# Patient Record
Sex: FEMALE | Race: WHITE | URBAN - METROPOLITAN AREA
[De-identification: names, ages, dates, MRNs, and addresses within clinical notes are randomized per-mention and may not be internally consistent; named-entity substitution may affect disease eponyms.]

---

## 2019-09-18 ENCOUNTER — OFFICE VISIT (OUTPATIENT)
Dept: URBAN - METROPOLITAN AREA CLINIC 17 | Facility: CLINIC | Age: 69
End: 2019-09-18
Payer: MEDICARE

## 2019-09-18 DIAGNOSIS — H40.1134 PRIMARY OPEN-ANGLE GLAUCOMA, BILATERAL, INDETERMINATE STAGE: Primary | ICD-10-CM

## 2019-09-18 PROCEDURE — 99204 OFFICE O/P NEW MOD 45 MIN: CPT | Performed by: OPHTHALMOLOGY

## 2019-09-18 PROCEDURE — 92133 CPTRZD OPH DX IMG PST SGM ON: CPT | Performed by: OPHTHALMOLOGY

## 2019-09-18 PROCEDURE — 76514 ECHO EXAM OF EYE THICKNESS: CPT | Performed by: OPHTHALMOLOGY

## 2019-09-18 PROCEDURE — 92020 GONIOSCOPY: CPT | Performed by: OPHTHALMOLOGY

## 2019-09-18 RX ORDER — LATANOPROST 50 UG/ML
0.005 % SOLUTION OPHTHALMIC
Qty: 2.5 | Refills: 11 | Status: INACTIVE
Start: 2019-09-18 | End: 2020-01-15

## 2019-09-18 ASSESSMENT — INTRAOCULAR PRESSURE
OD: 17
OS: 16

## 2019-09-18 NOTE — IMPRESSION/PLAN
Impression: Primary open-angle glaucoma, bilateral, indeterminate stage: H40.1134. -IOPs stable today, -average CCTs, no need to adjust IOPs Plan: Discussed diagnosis, explained and understood by patient. Discussed IOP/ONH/Glaucoma management and risks. Continue latanoprost QHS OU Will continue to monitor condition and symptoms.

## 2020-01-15 ENCOUNTER — NEW PATIENT (OUTPATIENT)
Dept: URBAN - METROPOLITAN AREA CLINIC 10 | Facility: CLINIC | Age: 70
End: 2020-01-15
Payer: MEDICARE

## 2020-01-15 DIAGNOSIS — H43.813 VITREOUS DEGENERATION, BILATERAL: ICD-10-CM

## 2020-01-15 PROCEDURE — 92083 EXTENDED VISUAL FIELD XM: CPT | Performed by: OPHTHALMOLOGY

## 2020-01-15 PROCEDURE — 76514 ECHO EXAM OF EYE THICKNESS: CPT | Performed by: OPHTHALMOLOGY

## 2020-01-15 PROCEDURE — 92004 COMPRE OPH EXAM NEW PT 1/>: CPT | Performed by: OPHTHALMOLOGY

## 2020-01-15 PROCEDURE — 92133 CPTRZD OPH DX IMG PST SGM ON: CPT | Performed by: OPHTHALMOLOGY

## 2020-01-15 PROCEDURE — 92020 GONIOSCOPY: CPT | Performed by: OPHTHALMOLOGY

## 2020-01-15 RX ORDER — LATANOPROST 50 UG/ML
0.005 % SOLUTION OPHTHALMIC
Qty: 3 | Refills: 1 | Status: INACTIVE
Start: 2020-01-15 | End: 2020-02-26

## 2020-01-15 ASSESSMENT — VISUAL ACUITY
OD: 20/25
OS: 20/25

## 2020-01-15 ASSESSMENT — INTRAOCULAR PRESSURE
OD: 16
OS: 18

## 2020-02-26 ENCOUNTER — FOLLOW UP ESTABLISHED (OUTPATIENT)
Dept: URBAN - METROPOLITAN AREA CLINIC 10 | Facility: CLINIC | Age: 70
End: 2020-02-26
Payer: MEDICARE

## 2020-02-26 PROCEDURE — 92082 INTERMEDIATE VISUAL FIELD XM: CPT | Performed by: OPHTHALMOLOGY

## 2020-02-26 PROCEDURE — 92012 INTRM OPH EXAM EST PATIENT: CPT | Performed by: OPHTHALMOLOGY

## 2020-02-26 RX ORDER — LATANOPROST 50 UG/ML
0.005 % SOLUTION OPHTHALMIC
Qty: 3 | Refills: 1 | Status: INACTIVE
Start: 2020-02-26 | End: 2020-08-20

## 2020-02-26 ASSESSMENT — INTRAOCULAR PRESSURE
OD: 17
OS: 19

## 2020-07-22 ENCOUNTER — FOLLOW UP ESTABLISHED (OUTPATIENT)
Dept: URBAN - METROPOLITAN AREA CLINIC 30 | Facility: CLINIC | Age: 70
End: 2020-07-22
Payer: MEDICARE

## 2020-07-22 PROCEDURE — 92014 COMPRE OPH EXAM EST PT 1/>: CPT | Performed by: OPHTHALMOLOGY

## 2020-07-22 ASSESSMENT — INTRAOCULAR PRESSURE
OD: 18
OS: 18

## 2020-10-14 ENCOUNTER — FOLLOW UP ESTABLISHED (OUTPATIENT)
Dept: URBAN - METROPOLITAN AREA CLINIC 30 | Facility: CLINIC | Age: 70
End: 2020-10-14
Payer: MEDICARE

## 2020-10-14 DIAGNOSIS — H40.1131 PRIMARY OPEN-ANGLE GLAUCOMA, BILATERAL, MILD STAGE: Primary | ICD-10-CM

## 2020-10-14 PROCEDURE — 92083 EXTENDED VISUAL FIELD XM: CPT | Performed by: OPHTHALMOLOGY

## 2020-10-14 PROCEDURE — 92014 COMPRE OPH EXAM EST PT 1/>: CPT | Performed by: OPHTHALMOLOGY

## 2020-10-14 ASSESSMENT — INTRAOCULAR PRESSURE
OD: 17
OS: 17

## 2020-11-06 ENCOUNTER — SURGERY (OUTPATIENT)
Dept: URBAN - METROPOLITAN AREA SURGERY 12 | Facility: SURGERY | Age: 70
End: 2020-11-06
Payer: MEDICARE

## 2020-11-06 PROCEDURE — 65855 TRABECULOPLASTY LASER SURG: CPT | Performed by: OPHTHALMOLOGY

## 2020-11-20 ENCOUNTER — SURGERY (OUTPATIENT)
Dept: URBAN - METROPOLITAN AREA SURGERY 12 | Facility: SURGERY | Age: 70
End: 2020-11-20
Payer: MEDICARE

## 2020-11-20 PROCEDURE — 65855 TRABECULOPLASTY LASER SURG: CPT | Performed by: OPHTHALMOLOGY

## 2021-02-03 ENCOUNTER — FOLLOW UP ESTABLISHED (OUTPATIENT)
Dept: URBAN - METROPOLITAN AREA CLINIC 30 | Facility: CLINIC | Age: 71
End: 2021-02-03
Payer: MEDICARE

## 2021-02-03 PROCEDURE — 99214 OFFICE O/P EST MOD 30 MIN: CPT | Performed by: OPHTHALMOLOGY

## 2021-02-03 ASSESSMENT — INTRAOCULAR PRESSURE
OS: 17
OD: 17

## 2021-05-26 ENCOUNTER — OFFICE VISIT (OUTPATIENT)
Dept: URBAN - METROPOLITAN AREA CLINIC 30 | Facility: CLINIC | Age: 71
End: 2021-05-26
Payer: MEDICARE

## 2021-05-26 PROCEDURE — 99214 OFFICE O/P EST MOD 30 MIN: CPT | Performed by: OPHTHALMOLOGY

## 2021-05-26 RX ORDER — DORZOLAMIDE HCL 20 MG/ML
2 % SOLUTION/ DROPS OPHTHALMIC
Qty: 5 | Refills: 3 | Status: INACTIVE
Start: 2021-05-26 | End: 2021-08-25

## 2021-05-26 ASSESSMENT — INTRAOCULAR PRESSURE
OS: 16
OD: 16

## 2021-05-26 NOTE — IMPRESSION/PLAN
Impression: Primary open-angle glaucoma, mild stage, bilateral
Intolerant to [employer] Plan: Pt has Glaucoma    Gonio :TM nasal all other quadrants S 1+ PG Pachs: 547/547     Today's IOP : 16/16     Tmax & date :  18/19 Target IOP low to mid teens Pt denies Fhx of Glaucoma Left eye is the better seeing eye Last vf OD Superior Nasal scotoma OS Full 7/21/20 C/D:  0.8x0.8 tilt, thin inferior temporal / .6-.6 very thin superior temporal rim OCT: 76/69  (1/15/2020) Pt denies Sulfa Allergy   // Pt denies Lung /Heart dx Pt is currently using : Latanoprost QHS OU Plan :
1. Cont:
Latanoprost QHS OU 
START Dorzolamide BID OU 2. Patient's IOP was unchanged with SLT OU. Intolerant to [employer]. Will have patient switch medicaiton and return for follow up. 
3. Return in 8 week IOP check

## 2021-07-22 ENCOUNTER — OFFICE VISIT (OUTPATIENT)
Dept: URBAN - METROPOLITAN AREA CLINIC 24 | Facility: CLINIC | Age: 71
End: 2021-07-22
Payer: MEDICARE

## 2021-07-22 PROCEDURE — 99213 OFFICE O/P EST LOW 20 MIN: CPT | Performed by: OPHTHALMOLOGY

## 2021-07-22 ASSESSMENT — INTRAOCULAR PRESSURE
OS: 15
OD: 16

## 2021-07-22 NOTE — IMPRESSION/PLAN
Impression: Primary open-angle glaucoma, mild stage, bilateral
Intolerant to [employer] Plan: Pt has Glaucoma    Gonio :TM nasal all other quadrants S 1+ PG Pachs: 547/547     Today's IOP : 16/16     Tmax & date :  16/15 Target IOP low to mid teens Pt denies Fhx of Glaucoma Left eye is the better seeing eye Last vf OD Superior Nasal scotoma OS Full 7/22/21 C/D:  0.8x0.8 tilt, thin inferior temporal / .6-.6 very thin superior temporal rim OCT: 76/69  (1/15/2020) Pt denies Sulfa Allergy   // Pt denies Lung /Heart dx Pt is currently using : Latanoprost QHS OU, Dorzolomide BID OU Plan :
1. Cont:
Latanoprost QHS OU Dorzolamide BID OU 2. Patient's IOP is stable along with stable VF 3.  Return 6 months DFE/repeat glare

## 2022-02-02 ENCOUNTER — OFFICE VISIT (OUTPATIENT)
Dept: URBAN - METROPOLITAN AREA CLINIC 30 | Facility: CLINIC | Age: 72
End: 2022-02-02
Payer: MEDICARE

## 2022-02-02 DIAGNOSIS — H25.12 AGE-RELATED NUCLEAR CATARACT, LEFT EYE: ICD-10-CM

## 2022-02-02 DIAGNOSIS — H25.11 AGE-RELATED NUCLEAR CATARACT, RIGHT EYE: ICD-10-CM

## 2022-02-02 PROCEDURE — 99214 OFFICE O/P EST MOD 30 MIN: CPT | Performed by: OPHTHALMOLOGY

## 2022-02-02 ASSESSMENT — INTRAOCULAR PRESSURE
OS: 12
OD: 12

## 2022-02-02 NOTE — IMPRESSION/PLAN
Impression: Age-related nuclear cataract, right eye: H25.11. Plan: Discussed cataract diagnosis with the patient. Discussed risks, benefits and alternatives to surgery including but not limited to: bleeding, infection, risk of vision loss, loss of the eye, need for other surgery. Patient voiced understanding and wishes to proceed. Patient elects surgical treatment. Patient desires surgery OU. Patient understands the need for glasses after surgery for BCVA. MIGS Discussed with pt limitations of MIGS device and it does not replace  Glaucoma eye drops and would have to continue treatment and would still need glasses after surgery. Understands possible use of iris stretch. Right eye first (PC IOL (Standard W/ canaloplasty /Hydrus )(+)Dexy  (AIM: -3.00) ascan needed)  (NO ORA, NO LRI, NO LENSX) Patient to return for NON dilated Preop   (Discussed option of Toric lens if needed) Minutes

## 2022-02-02 NOTE — IMPRESSION/PLAN
Impression: Primary open-angle glaucoma, mild stage, bilateral
Intolerant to [employer] & Dorzolamide Plan: Pt has Glaucoma    Gonio :TM nasal all other quadrants S 1+ PG Pachs: 547/547     Today's IOP : 16/16     Tmax & date :  16/15 Target IOP low to mid teens Pt denies Fhx of Glaucoma Left eye is the better seeing eye Last vf OD Superior Nasal scotoma OS Full 7/22/21 C/D:  0.9x0.9/0.5x0.5 360 PPA OU 
OCT: 76/69  (1/15/2020) Pt denies Sulfa Allergy   // Pt denies Lung /Heart dx Pt is currently using : Latanoprost QHS OU, Dorzolomide BID OU Plan :
1. Cont:
Latanoprost QHS OU 
STOP Dorzolamide 2/2 allergy 2. Patient presents with allergy today. Will Stop medications as above.  
3. Recommend cat W MIGS

## 2022-02-02 NOTE — IMPRESSION/PLAN
Impression: Age-related nuclear cataract, left eye: H25.12. Plan: Discussed cataract diagnosis with the patient. Discussed risks, benefits and alternatives to surgery including but not limited to: bleeding, infection, risk of vision loss, loss of the eye, need for other surgery. Patient voiced understanding and wishes to proceed. Patient elects surgical treatment. Patient desires surgery OU. Patient understands the need for glasses after surgery for BCVA. MIGS Discussed with pt limitations of MIGS device and it does not replace  Glaucoma eye drops and would have to continue treatment and would still need glasses after surgery. Understands possible use of iris stretch. Left eye second( (PC IOL (Standard W/ canaloplasty /Hydrus )(+)Dexy  (AIM: -3.00) ascan needed)  (NO ORA, NO LRI, NO LENSX) Patient to return for NON dilated Preop (Discussed option of Toric lens if needed)  15 Minutes

## 2022-03-10 ENCOUNTER — TESTING ONLY (OUTPATIENT)
Dept: URBAN - METROPOLITAN AREA CLINIC 30 | Facility: CLINIC | Age: 72
End: 2022-03-10
Payer: MEDICARE

## 2022-03-10 PROCEDURE — 92083 EXTENDED VISUAL FIELD XM: CPT | Performed by: OPTOMETRIST

## 2022-04-01 ENCOUNTER — ADULT PHYSICAL (OUTPATIENT)
Dept: URBAN - METROPOLITAN AREA CLINIC 24 | Facility: CLINIC | Age: 72
End: 2022-04-01
Payer: MEDICARE

## 2022-04-01 DIAGNOSIS — Z01.818 ENCOUNTER FOR OTHER PREPROCEDURAL EXAMINATION: Primary | ICD-10-CM

## 2022-04-01 PROCEDURE — 99203 OFFICE O/P NEW LOW 30 MIN: CPT | Performed by: PHYSICIAN ASSISTANT

## 2022-04-01 RX ORDER — ESTRADIOL 0.1 MG/G
0.01 % CREAM VAGINAL
Qty: 0 | Refills: 0 | Status: ACTIVE
Start: 2022-04-01

## 2022-04-01 ASSESSMENT — PACHYMETRY
OD: 25.24
OS: 3.02
OS: 25.36
OD: 3.00

## 2022-04-04 ENCOUNTER — OFFICE VISIT (OUTPATIENT)
Dept: URBAN - METROPOLITAN AREA CLINIC 24 | Facility: CLINIC | Age: 72
End: 2022-04-04
Payer: MEDICARE

## 2022-04-04 DIAGNOSIS — H25.13 AGE-RELATED NUCLEAR CATARACT, BILATERAL: ICD-10-CM

## 2022-04-04 DIAGNOSIS — H33.323 BILATERAL ROUND HOLES OF RETINA: ICD-10-CM

## 2022-04-04 PROCEDURE — 99214 OFFICE O/P EST MOD 30 MIN: CPT | Performed by: OPHTHALMOLOGY

## 2022-04-04 ASSESSMENT — VISUAL ACUITY
OS: 20/25
OD: 20/25

## 2022-04-04 ASSESSMENT — INTRAOCULAR PRESSURE
OD: 13
OS: 14

## 2022-04-04 NOTE — IMPRESSION/PLAN
Impression: Primary open-angle glaucoma, mild stage, bilateral
Intolerant to [employer] & Dorzolamide Plan: Pt has Glaucoma    Gonio :TM nasal all other quadrants S 1+ PG Pachs: 547/547     Today's IOP : 13/14    Tmax & date :  16/15 Target IOP low to mid teens Pt denies Fhx of Glaucoma Left eye is the better seeing eye Last vf OD Superior Nasal scotoma OS Full 7/22/21 C/D:  0.9x0.9/0.5x0.5 360 PPA OU 
OCT: 76/69  (1/15/2020) Pt denies Sulfa Allergy  // Pt denies Lung /Heart dx Plan :
1. Continue:
Latanoprost QHS OU 2. Patient presents with allergy today. Will Stop medications as above. 3. Return to clinic as schedule for cat sx with migs ou
4.  Goal with cat/migs to eliminate all glc meds

## 2022-04-04 NOTE — IMPRESSION/PLAN
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  Plan: bilateral retinal hole - stable OU; good on depressed exam

## 2022-04-04 NOTE — IMPRESSION/PLAN
Impression: Age-related nuclear cataract, bilateral: H25.13. Plan: STANDARTD LENS (OD THEN OS)(NEAR AIM -3.00 OU: + DEXYCU 1st choice, + Block, NO ORA OU , NO LenSx OU,+ MIGS OU (OMNI,HYDRUS),)) - 

-15 Min Discussed cataract diagnosis with the patient. Appropriate testing ordered for cataract diagnosis prior to Preop. Risks and benefits of surgical treatment were discussed and understood. Patient desires surgical treatment. Discussed lens options with pt and pt is ok with wearing glasses after surgery. Patient desires surgery to proceed with surgery OD THEN OS. Both eyes examined, medically necessary due to impact in activities of daily living. Discussed with pt limitations of MIGS device and it does not replace  Glaucoma eye drops and would have to continue treatment and would still need glasses after surgery. Discussed higher risks with smaller pupil and discussed iris stretch and higher risks of bleeding.  Discussed there is a chance of developing capsular haze after surgery, which may be corrected with laser/yag

## 2022-04-21 ENCOUNTER — SURGERY (OUTPATIENT)
Dept: URBAN - METROPOLITAN AREA SURGERY 12 | Facility: SURGERY | Age: 72
End: 2022-04-21
Payer: MEDICARE

## 2022-04-21 DIAGNOSIS — H40.1131 PRIMARY OPEN-ANGLE GLAUCOMA, BILATERAL, MILD STAGE: ICD-10-CM

## 2022-04-21 DIAGNOSIS — H25.13 AGE-RELATED NUCLEAR CATARACT, BILATERAL: Primary | ICD-10-CM

## 2022-04-21 PROCEDURE — 66175 TRLUML DIL AQ O/F CAN W/ST: CPT | Performed by: OPHTHALMOLOGY

## 2022-04-22 ENCOUNTER — POST-OPERATIVE VISIT (OUTPATIENT)
Dept: URBAN - METROPOLITAN AREA CLINIC 30 | Facility: CLINIC | Age: 72
End: 2022-04-22
Payer: MEDICARE

## 2022-04-22 DIAGNOSIS — Z48.810 ENCOUNTER FOR SURGICAL AFTERCARE FOLLOWING SURGERY ON A SENSE ORGAN: Primary | ICD-10-CM

## 2022-04-22 PROCEDURE — 99024 POSTOP FOLLOW-UP VISIT: CPT | Performed by: OPTOMETRIST

## 2022-04-22 ASSESSMENT — INTRAOCULAR PRESSURE: OD: 13

## 2022-04-22 NOTE — IMPRESSION/PLAN
Impression: S/P Cataract Extraction by phacoemulsification with IOL placement/OMNI/Hydrus OD - 1 Day. Encounter for surgical aftercare following surgery on a sense organ  Z48.810. Plan: -3.00 aim. Continue ATs TID-QID OU. Continue latanoprost qhs OU. RTC as scheduled for 1 week PO, refract and dilate OU.

## 2022-04-28 ENCOUNTER — POST-OPERATIVE VISIT (OUTPATIENT)
Dept: URBAN - METROPOLITAN AREA CLINIC 30 | Facility: CLINIC | Age: 72
End: 2022-04-28
Payer: MEDICARE

## 2022-04-28 DIAGNOSIS — Z48.810 ENCOUNTER FOR SURGICAL AFTERCARE FOLLOWING SURGERY ON A SENSE ORGAN: Primary | ICD-10-CM

## 2022-04-28 PROCEDURE — 99024 POSTOP FOLLOW-UP VISIT: CPT | Performed by: OPTOMETRIST

## 2022-04-28 ASSESSMENT — KERATOMETRY
OS: 43.05
OD: 43.47

## 2022-04-28 ASSESSMENT — VISUAL ACUITY
OS: 20/30
OD: 20/40

## 2022-04-28 ASSESSMENT — INTRAOCULAR PRESSURE
OD: 6
OS: 14

## 2022-04-28 NOTE — IMPRESSION/PLAN
Impression: S/P Cataract Extraction by phacoemulsification with IOL placement/OMNI/Hydrus OD - 7 Days. Encounter for surgical aftercare following surgery on a sense organ  Z48.810. Plan: Using ATs ~ q2hrs. Significant dryness on exam today. Continue ATs. Start august qhs OD Had Dilated exam OU at Dr Otilio Bolivar preop 4/4/22 IOP is low- 6 today- d/c latanoprost OD. RTC for 1 week po as scheduled.

## 2022-05-05 ENCOUNTER — SURGERY (OUTPATIENT)
Dept: URBAN - METROPOLITAN AREA SURGERY 12 | Facility: SURGERY | Age: 72
End: 2022-05-05
Payer: MEDICARE

## 2022-05-05 DIAGNOSIS — H25.12 AGE-RELATED NUCLEAR CATARACT, LEFT EYE: Primary | ICD-10-CM

## 2022-05-05 DIAGNOSIS — H40.1131 PRIMARY OPEN-ANGLE GLAUCOMA, BILATERAL, MILD STAGE: ICD-10-CM

## 2022-05-05 PROCEDURE — 66175 TRLUML DIL AQ O/F CAN W/ST: CPT | Performed by: OPHTHALMOLOGY

## 2022-05-06 ENCOUNTER — POST-OPERATIVE VISIT (OUTPATIENT)
Dept: URBAN - METROPOLITAN AREA CLINIC 30 | Facility: CLINIC | Age: 72
End: 2022-05-06
Payer: MEDICARE

## 2022-05-06 DIAGNOSIS — Z96.1 PRESENCE OF INTRAOCULAR LENS: Primary | ICD-10-CM

## 2022-05-06 PROCEDURE — 99024 POSTOP FOLLOW-UP VISIT: CPT | Performed by: OPTOMETRIST

## 2022-05-06 ASSESSMENT — INTRAOCULAR PRESSURE: OS: 14

## 2022-05-06 NOTE — IMPRESSION/PLAN
Impression: S/P Cataract Extraction by phacoemulsification with IOL placement; Canaloplasty/hydrus OS - 1 Day. Presence of intraocular lens  Z96.1. Plan: -3.00 aim. Use ATs TID-QID OU. off latanoprost OD, will d/c for now OS.  
rtc for final PO-DILATE OU  (saw some new floaters OD, denies flashes, call if s/sx of RD occur)

## 2022-06-02 ENCOUNTER — POST-OPERATIVE VISIT (OUTPATIENT)
Dept: URBAN - METROPOLITAN AREA CLINIC 30 | Facility: CLINIC | Age: 72
End: 2022-06-02
Payer: MEDICARE

## 2022-06-02 DIAGNOSIS — Z48.810 ENCOUNTER FOR SURGICAL AFTERCARE FOLLOWING SURGERY ON A SENSE ORGAN: Primary | ICD-10-CM

## 2022-06-02 DIAGNOSIS — H52.4 PRESBYOPIA: ICD-10-CM

## 2022-06-02 PROCEDURE — 99024 POSTOP FOLLOW-UP VISIT: CPT | Performed by: OPTOMETRIST

## 2022-06-02 ASSESSMENT — KERATOMETRY
OD: 43.55
OS: 43.27

## 2022-06-02 ASSESSMENT — INTRAOCULAR PRESSURE
OD: 10
OS: 14

## 2022-06-02 ASSESSMENT — VISUAL ACUITY
OD: 20/25
OS: 20/25

## 2022-06-02 NOTE — IMPRESSION/PLAN
Impression: S/P Cataract Extraction by phacoemulsification with IOL placement; Canaloplasty/hydrus OS - 28 Days. Encounter for surgical aftercare following surgery on a sense organ  Z48.810.  Plan: Using ATs BID-TID OU and august qhs OU.
pt deferred dilation today due to  being in hospital
denies flashes, no change in floaters lately, will dilate later
discussed mild PC haze

has been off latanoprost OU since each sx
check IOP in 2-3 months and DILATE OU

## 2022-09-02 ENCOUNTER — OFFICE VISIT (OUTPATIENT)
Dept: URBAN - METROPOLITAN AREA CLINIC 30 | Facility: CLINIC | Age: 72
End: 2022-09-02
Payer: MEDICARE

## 2022-09-02 DIAGNOSIS — H43.813 VITREOUS DEGENERATION, BILATERAL: ICD-10-CM

## 2022-09-02 DIAGNOSIS — H33.323 ROUND HOLE OF RETINA, BILATERAL: ICD-10-CM

## 2022-09-02 DIAGNOSIS — H40.1131 PRIMARY OPEN-ANGLE GLAUCOMA, BILATERAL, MILD STAGE: Primary | ICD-10-CM

## 2022-09-02 DIAGNOSIS — H26.493 OTHER SECONDARY CATARACT, BILATERAL: ICD-10-CM

## 2022-09-02 DIAGNOSIS — H35.371 PUCKERING OF MACULA, RIGHT EYE: ICD-10-CM

## 2022-09-02 PROCEDURE — 99213 OFFICE O/P EST LOW 20 MIN: CPT | Performed by: OPTOMETRIST

## 2022-09-02 ASSESSMENT — INTRAOCULAR PRESSURE
OD: 13
OS: 14

## 2022-09-02 NOTE — IMPRESSION/PLAN
Impression: Other secondary cataract, bilateral: H26.493. Plan: Some increase in PCO, still mild. Continue to monitor.

## 2022-09-02 NOTE — IMPRESSION/PLAN
Impression: Round hole of retina, bilateral: H33.323. Plan: Old hole with AL  scar @ 9 OD, inferior hole @ 6- surrounds CR scar - depression on exam OS. Retinas flat and attached, s/sx RD reviewed.

## 2022-09-02 NOTE — IMPRESSION/PLAN
Impression: Primary open-angle glaucoma, mild stage, bilateral
Pachs: 547/547 Off gtts since after each cataract sx Intolerant to [employer] & Dorzolamide Gonio :TM nasal all other quadrants S 1+ PG Tmax & date :  16/15 Target IOP low to mid teens Pt denies Fhx of Glaucoma Left eye is the better seeing eye Plan: IOPs today: (13,14), s tx. Stopped drops after cataract sx/omni/hydrus OU. VF 7/2021: OD Superior Nasal scotoma OS Full C/D:  0.9x0.9/0.5x0.5 360 PPA OU 

RNFL 1/2020: 76/69  (1/15/2020)

## 2023-01-03 ENCOUNTER — OFFICE VISIT (OUTPATIENT)
Dept: URBAN - METROPOLITAN AREA CLINIC 30 | Facility: CLINIC | Age: 73
End: 2023-01-03
Payer: MEDICARE

## 2023-01-03 DIAGNOSIS — H40.1131 PRIMARY OPEN-ANGLE GLAUCOMA, BILATERAL, MILD STAGE: Primary | ICD-10-CM

## 2023-01-03 PROCEDURE — 92133 CPTRZD OPH DX IMG PST SGM ON: CPT | Performed by: OPTOMETRIST

## 2023-01-03 PROCEDURE — 99213 OFFICE O/P EST LOW 20 MIN: CPT | Performed by: OPTOMETRIST

## 2023-01-03 ASSESSMENT — INTRAOCULAR PRESSURE
OD: 13
OS: 15
OD: 15
OS: 13

## 2023-01-03 NOTE — IMPRESSION/PLAN
Impression: Primary open-angle glaucoma, mild stage, bilateral
Pachs: 547/547 Off gtts since after each cataract sx Intolerant to [employer] & Dorzolamide Gonio :TM nasal all other quadrants S 1+ PG Tmax & date :  16/15 Target IOP low to mid teens Pt denies Fhx of Glaucoma Left eye is the better seeing eye Plan: IOPs little higher s tx, 15 OU. Stopped drops after cataract sx/omni/hydrus OU. VF 7/2021: OD Superior Nasal scotoma OS Full; Last VF 3/22 C/D:  0.9x0.9/0.5x0.5 360 PPA OU 

RNFL 1/2020: 76/69; RNFL 2/21 stable. RNFL 1/2023 abnormal NFL OU, appears thinner OD>OS c poor scan quality. Continue to monitor off tx. Discussed RNFL changes, update VF next visit, IOP may need to be lower, pt understands may need tx again.

## 2023-03-02 ENCOUNTER — OFFICE VISIT (OUTPATIENT)
Dept: URBAN - METROPOLITAN AREA CLINIC 30 | Facility: CLINIC | Age: 73
End: 2023-03-02
Payer: MEDICARE

## 2023-03-02 DIAGNOSIS — H35.371 PUCKERING OF MACULA, RIGHT EYE: ICD-10-CM

## 2023-03-02 DIAGNOSIS — H43.813 VITREOUS DEGENERATION, BILATERAL: ICD-10-CM

## 2023-03-02 DIAGNOSIS — H26.493 OTHER SECONDARY CATARACT, BILATERAL: ICD-10-CM

## 2023-03-02 DIAGNOSIS — H04.123 TEAR FILM INSUFFICIENCY OF BILATERAL LACRIMAL GLANDS: ICD-10-CM

## 2023-03-02 DIAGNOSIS — H33.323 ROUND HOLE OF RETINA, BILATERAL: ICD-10-CM

## 2023-03-02 DIAGNOSIS — H40.1131 PRIMARY OPEN-ANGLE GLAUCOMA, BILATERAL, MILD STAGE: Primary | ICD-10-CM

## 2023-03-02 PROCEDURE — 92083 EXTENDED VISUAL FIELD XM: CPT | Performed by: OPTOMETRIST

## 2023-03-02 PROCEDURE — 99214 OFFICE O/P EST MOD 30 MIN: CPT | Performed by: OPTOMETRIST

## 2023-03-02 PROCEDURE — 92134 CPTRZ OPH DX IMG PST SGM RTA: CPT | Performed by: OPTOMETRIST

## 2023-03-02 RX ORDER — LATANOPROST 50 UG/ML
0.005 % SOLUTION OPHTHALMIC
Qty: 7.5 | Refills: 3 | Status: ACTIVE
Start: 2023-03-02

## 2023-03-02 ASSESSMENT — VISUAL ACUITY
OD: 20/30
OS: 20/30

## 2023-03-02 ASSESSMENT — KERATOMETRY
OS: 43.22
OD: 43.41

## 2023-03-02 ASSESSMENT — INTRAOCULAR PRESSURE
OD: 15
OS: 15

## 2023-03-02 NOTE — IMPRESSION/PLAN
Impression: Primary open-angle glaucoma, mild stage, bilateral
Pachs: 547/547 Off gtts since after each cataract sx Intolerant to [employer] & Dorzolamide Target IOP: 11-12 OU Gonio :TM nasal all other quadrants S 1+ PG Tmax & date :  18/19 Target IOP low to mid teens Pt denies Fhx of Glaucoma C/D:  0.9x0.9/0.5x0.5 360 PPA OU Left eye is the better seeing eye Plan: IOPs stable to last s tx, at 15 OU. Stopped drops after cataract sx/omni/hydrus OU April/May 2022. VF 7/2021: OD Superior Nasal scotoma OS Full; Last VF 3/22. VF 3/2023: increased sup and nasal defect OD, inf and few nasal defects OS. RNFL 1/2020: 76/69; RNFL 2/21 stable. RNFL 1/2023 abnormal NFL OU, appears thinner OD>OS c poor scan quality. Discussed RNFL changes last visit and now apparent progression on VF. Rec resume latanoprost QHS OU. Pt will be moving to [place] in a few weeks. Will continue care there. Pt to fill out MRR at checkout today so she can take records.

## 2023-03-02 NOTE — IMPRESSION/PLAN
Impression: Round hole of retina, bilateral: H33.323. Plan: Old hole with PA  scar @ 9 OD, inferior hole @ 6- surrounds CR scar - depression on exam OS. Retinas flat and attached, s/sx RD reviewed.

## 2023-03-02 NOTE — IMPRESSION/PLAN
Impression: Tear film insufficiency of bilateral lacrimal glands: H04.123. Plan: Rec regular use of ATs throughout the day.

## 2023-03-02 NOTE — IMPRESSION/PLAN
Impression: Other secondary cataract, bilateral: H26.493. Plan: Some increase in PCO, still mild. Continue to monitor. Pt will continue care in [place].

## 2023-03-02 NOTE — IMPRESSION/PLAN
Impression: Puckering of macula, right eye: H35.371. Plan: Appears stable again on MAC OCT. Continue to monitor.